# Patient Record
Sex: FEMALE | Race: WHITE | NOT HISPANIC OR LATINO | URBAN - METROPOLITAN AREA
[De-identification: names, ages, dates, MRNs, and addresses within clinical notes are randomized per-mention and may not be internally consistent; named-entity substitution may affect disease eponyms.]

---

## 2022-10-17 ENCOUNTER — OFFICE (OUTPATIENT)
Dept: URBAN - METROPOLITAN AREA CLINIC 29 | Facility: CLINIC | Age: 67
Setting detail: OPHTHALMOLOGY
End: 2022-10-17
Payer: MEDICARE

## 2022-10-17 ENCOUNTER — RX ONLY (RX ONLY)
Age: 67
End: 2022-10-17

## 2022-10-17 DIAGNOSIS — H43.811: ICD-10-CM

## 2022-10-17 DIAGNOSIS — H25.093: ICD-10-CM

## 2022-10-17 PROCEDURE — 92201 OPSCPY EXTND RTA DRAW UNI/BI: CPT | Performed by: OPHTHALMOLOGY

## 2022-10-17 PROCEDURE — 92134 CPTRZ OPH DX IMG PST SGM RTA: CPT | Performed by: OPHTHALMOLOGY

## 2022-10-17 PROCEDURE — 92004 COMPRE OPH EXAM NEW PT 1/>: CPT | Performed by: OPHTHALMOLOGY

## 2022-10-17 ASSESSMENT — REFRACTION_CURRENTRX
OS_CYLINDER: SPH
OD_AXIS: 151
OS_OVR_VA: 20/
OD_OVR_VA: 20/
OS_SPHERE: -3.25
OD_CYLINDER: +0.50
OD_SPHERE: -3.50
OD_ADD: +2.75

## 2022-10-17 ASSESSMENT — SPHEQUIV_DERIVED
OS_SPHEQUIV: -3.375
OD_SPHEQUIV: -3.875

## 2022-10-17 ASSESSMENT — VISUAL ACUITY
OD_BCVA: 20/25+
OS_BCVA: 20/20-

## 2022-10-17 ASSESSMENT — REFRACTION_AUTOREFRACTION
OD_CYLINDER: +1.75
OD_SPHERE: -4.75
OS_CYLINDER: +1.25
OS_SPHERE: -4.00
OD_AXIS: 100
OS_AXIS: 081

## 2022-10-17 ASSESSMENT — CONFRONTATIONAL VISUAL FIELD TEST (CVF)
OD_FINDINGS: FULL
OS_FINDINGS: FULL

## 2022-10-17 ASSESSMENT — TONOMETRY
OD_IOP_MMHG: 18
OS_IOP_MMHG: 19

## 2022-11-14 ENCOUNTER — OFFICE (OUTPATIENT)
Dept: URBAN - METROPOLITAN AREA CLINIC 29 | Facility: CLINIC | Age: 67
Setting detail: OPHTHALMOLOGY
End: 2022-11-14
Payer: MEDICARE

## 2022-11-14 DIAGNOSIS — H25.093: ICD-10-CM

## 2022-11-14 DIAGNOSIS — H43.811: ICD-10-CM

## 2022-11-14 PROCEDURE — 92250 FUNDUS PHOTOGRAPHY W/I&R: CPT | Performed by: OPHTHALMOLOGY

## 2022-11-14 PROCEDURE — 99213 OFFICE O/P EST LOW 20 MIN: CPT | Performed by: OPHTHALMOLOGY

## 2022-11-14 ASSESSMENT — VISUAL ACUITY
OS_BCVA: 20/20-
OD_BCVA: 20/25+

## 2022-11-14 ASSESSMENT — REFRACTION_CURRENTRX
OD_AXIS: 151
OD_SPHERE: -3.50
OD_CYLINDER: +0.50
OS_SPHERE: -3.25
OD_ADD: +2.75
OD_OVR_VA: 20/
OS_OVR_VA: 20/
OS_CYLINDER: SPH

## 2022-11-14 ASSESSMENT — REFRACTION_AUTOREFRACTION
OS_CYLINDER: +1.25
OD_AXIS: 100
OD_SPHERE: -4.75
OS_SPHERE: -4.00
OD_CYLINDER: +1.75
OS_AXIS: 081

## 2022-11-14 ASSESSMENT — CONFRONTATIONAL VISUAL FIELD TEST (CVF)
OS_FINDINGS: FULL
OD_FINDINGS: FULL

## 2022-11-14 ASSESSMENT — TONOMETRY
OS_IOP_MMHG: 17
OD_IOP_MMHG: 18

## 2022-11-14 ASSESSMENT — SPHEQUIV_DERIVED
OD_SPHEQUIV: -3.875
OS_SPHEQUIV: -3.375

## 2023-02-17 ENCOUNTER — OFFICE (OUTPATIENT)
Dept: URBAN - METROPOLITAN AREA CLINIC 29 | Facility: CLINIC | Age: 68
Setting detail: OPHTHALMOLOGY
End: 2023-02-17
Payer: MEDICARE

## 2023-02-17 DIAGNOSIS — H25.093: ICD-10-CM

## 2023-02-17 DIAGNOSIS — H43.811: ICD-10-CM

## 2023-02-17 PROCEDURE — 99213 OFFICE O/P EST LOW 20 MIN: CPT | Performed by: OPHTHALMOLOGY

## 2023-02-17 ASSESSMENT — REFRACTION_CURRENTRX
OD_ADD: +2.75
OD_OVR_VA: 20/
OD_CYLINDER: +0.50
OD_AXIS: 151
OS_OVR_VA: 20/
OS_SPHERE: -3.25
OS_CYLINDER: SPH
OD_SPHERE: -3.50

## 2023-02-17 ASSESSMENT — TONOMETRY
OS_IOP_MMHG: 17
OD_IOP_MMHG: 17

## 2023-02-17 ASSESSMENT — VISUAL ACUITY
OD_BCVA: 20/25+
OS_BCVA: 20/20

## 2023-02-17 ASSESSMENT — CONFRONTATIONAL VISUAL FIELD TEST (CVF)
OS_FINDINGS: FULL
OD_FINDINGS: FULL

## 2023-02-17 ASSESSMENT — REFRACTION_AUTOREFRACTION
OS_SPHERE: -4.00
OS_AXIS: 081
OD_CYLINDER: +1.75
OD_SPHERE: -4.75
OD_AXIS: 100
OS_CYLINDER: +1.25

## 2023-02-17 ASSESSMENT — SPHEQUIV_DERIVED
OS_SPHEQUIV: -3.375
OD_SPHEQUIV: -3.875

## 2023-11-13 ENCOUNTER — OFFICE (OUTPATIENT)
Dept: URBAN - METROPOLITAN AREA CLINIC 29 | Facility: CLINIC | Age: 68
Setting detail: OPHTHALMOLOGY
End: 2023-11-13
Payer: MEDICARE

## 2023-11-13 DIAGNOSIS — H43.811: ICD-10-CM

## 2023-11-13 DIAGNOSIS — H25.093: ICD-10-CM

## 2023-11-13 DIAGNOSIS — H52.13: ICD-10-CM

## 2023-11-13 DIAGNOSIS — H43.391: ICD-10-CM

## 2023-11-13 DIAGNOSIS — H25.13: ICD-10-CM

## 2023-11-13 PROBLEM — H52.7 REFRACTIVE ERROR: Status: ACTIVE | Noted: 2023-11-13

## 2023-11-13 PROCEDURE — 92014 COMPRE OPH EXAM EST PT 1/>: CPT | Performed by: OPHTHALMOLOGY

## 2023-11-13 PROCEDURE — 92015 DETERMINE REFRACTIVE STATE: CPT | Performed by: OPHTHALMOLOGY

## 2023-11-13 ASSESSMENT — REFRACTION_CURRENTRX
OD_AXIS: 151
OS_SPHERE: -3.50
OS_AXIS: 090
OD_SPHERE: -3.50
OS_CYLINDER: PLANO
OD_ADD: +2.75
OS_OVR_VA: 20/
OS_CYLINDER: SPH
OD_OVR_VA: 20/
OD_SPHERE: -3.50
OD_OVR_VA: 20/
OD_AXIS: 140
OS_OVR_VA: 20/
OS_SPHERE: -3.25
OD_CYLINDER: +0.50
OD_CYLINDER: +0.50

## 2023-11-13 ASSESSMENT — SPHEQUIV_DERIVED
OD_SPHEQUIV: -4
OS_SPHEQUIV: -3
OS_SPHEQUIV: -3.125
OD_SPHEQUIV: -3.125

## 2023-11-13 ASSESSMENT — CONFRONTATIONAL VISUAL FIELD TEST (CVF)
OS_FINDINGS: FULL
OD_FINDINGS: FULL

## 2023-11-13 ASSESSMENT — REFRACTION_AUTOREFRACTION
OD_SPHERE: -4.50
OD_CYLINDER: +1.00
OS_CYLINDER: +0.75
OS_AXIS: 080
OS_SPHERE: -3.50
OD_AXIS: 100

## 2023-11-13 ASSESSMENT — REFRACTION_MANIFEST
OS_SPHERE: -3.25
OS_VA1: 20/25
OS_CYLINDER: +0.50
OD_AXIS: 100
OD_CYLINDER: +0.75
OD_VA1: 20/20
OS_AXIS: 090
OD_SPHERE: -3.50

## 2024-01-29 ENCOUNTER — APPOINTMENT (OUTPATIENT)
Dept: PAIN MANAGEMENT | Facility: CLINIC | Age: 69
End: 2024-01-29
Payer: MEDICARE

## 2024-01-29 VITALS
BODY MASS INDEX: 28.53 KG/M2 | WEIGHT: 161 LBS | HEIGHT: 63 IN | SYSTOLIC BLOOD PRESSURE: 120 MMHG | DIASTOLIC BLOOD PRESSURE: 74 MMHG

## 2024-01-29 DIAGNOSIS — M54.16 RADICULOPATHY, LUMBAR REGION: ICD-10-CM

## 2024-01-29 DIAGNOSIS — Z86.39 PERSONAL HISTORY OF OTHER ENDOCRINE, NUTRITIONAL AND METABOLIC DISEASE: ICD-10-CM

## 2024-01-29 DIAGNOSIS — M47.817 SPONDYLOSIS W/OUT MYELOPATHY OR RADICULOPATHY, LUMBOSACRAL REGION: ICD-10-CM

## 2024-01-29 DIAGNOSIS — R52 PAIN, UNSPECIFIED: ICD-10-CM

## 2024-01-29 DIAGNOSIS — Z86.79 PERSONAL HISTORY OF OTHER DISEASES OF THE CIRCULATORY SYSTEM: ICD-10-CM

## 2024-01-29 DIAGNOSIS — M79.10 MYALGIA, UNSPECIFIED SITE: ICD-10-CM

## 2024-01-29 PROCEDURE — 99204 OFFICE O/P NEW MOD 45 MIN: CPT

## 2024-01-29 RX ORDER — ROSUVASTATIN CALCIUM 5 MG/1
5 TABLET, FILM COATED ORAL
Refills: 0 | Status: ACTIVE | COMMUNITY

## 2024-01-29 RX ORDER — LOSARTAN POTASSIUM 50 MG/1
50 TABLET, FILM COATED ORAL
Refills: 0 | Status: ACTIVE | COMMUNITY

## 2024-01-29 NOTE — HISTORY OF PRESENT ILLNESS
[Joint Pain] : joint  [_______] : [unfilled] [___ mths] : [unfilled] month(s) ago [Constant] : constant [3] : a current pain level of 3/10 [4] : a minimum pain level of 4/10 [6] : a maximum pain level of 6/10 [Burning] : burning [Laying] : laying [Sitting] : sitting [Ice] : ice [FreeTextEntry1] : HPI: Ms. CELI MURILLO is a 68 year F with pmhx of HTN, HLD, hip tendinosis, hx right gluteal tear. C/o bilateral thigh pain worsening over the past few months. Pain can be severe and impacts her ability to perform ADL's. Relief in the past with prn NSAIDS, walking, and MD directed exercises, which is no longer as effective. Reports improvement with ice and cannabis gummies and night. Denies any additional weakness, numbness, bowel/bladder dysfunction, history of bleeding disorders. Quality of life is impaired. There has been a severe exacerbation of the patient's chronic pain.   [FreeTextEntry7] : Joaquin leg pain

## 2024-01-29 NOTE — ASSESSMENT
[FreeTextEntry1] : 68 yof presents w/ low back and leg pain. Has had JOHN previously without any relief. Quality of life is impaired. There has been a severe exacerbation of the patient's chronic pain.  I have personally reviewed the patient's MRI in detail and discussed it with them which is significant for severe facet arthritis at L3, L4, and L5.  The patient has moderate to severe chronic axial back pain which has been present for at least three months and has failed to improve with conservative therapy. There is no untreated radiculopathy. There is no other known cause of axial back pain in this patient. The patient has failed to have relief with medication management and physical therapy. Given the patients failure to improve with all other conservative measures, recommend bilateral L3, L4, and L5 medial branch diagnostic block under fluoroscopic guidance. If the patient has significant relief of pain and improved quality of life following diagnostic block, will proceed to radiofrequency ablation.  I have discussed in detail with the patient that an interventional spine procedure is associated with potential risks. The procedure may include an injection of steroid and potentially other medications (local anesthetic and normal saline) into the epidural space or surrounding tissue of the spine. There are significant risks of this procedure which include and are not limited to infection, bleeding, worsening pain, dural puncture leading to post-dural puncture headache, nerve damage, spinal cord injury, paralysis, stroke, and death. There is a chance that the procedure does not improve their pain. There are risks associated with the steroid being absorbed into the body systemically. These include dysphoria, difficulty sleeping, mood swings, and personality changes. Pre-menopausal women may notice a regularity his in her menstrual cycle for 2-3 months following the injection. Steroids can specifically affect patients with hypertension, diabetes, and peptic ulcers. The procedure may cause a temporary increase in blood pressure and blood glucose, and may adversely affect a peptic ulcer. Other, more rare complications, including avascular necrosis of the joints, glaucoma, and osteoporosis. I have discussed the risks of the procedure at length with the patient, and the potential benefits of pain relief. I have offered alternatives to the procedure. All questions were answered. The patient expressed understanding and wishes to proceed with the procedure.  Physical therapy prescribed - goal will be to increase ROM, strengthening, postural training, other modalities ad mike which may include massage and stim. Goals of therapy discussed with the patient in detail and will be discussed with physical therapist. Patient will follow-up following course of physical therapy to monitor progress and adjust therapy as needed.  Acetaminophen 1,000 mg q8h prn for moderate pain. Risks, benefits, and alternatives of acetaminophen discussed with patient.  Ibuprofen 600 mg q8h prn add when pain is not adequately controlled with acetaminophen. Risks, benefits, and alternatives of ibuprofen discussed with patient.  Diet and nutritional strategies discussed which may improve patients pain and will improve overall health.

## 2024-01-29 NOTE — DATA REVIEWED
[FreeTextEntry1] : Exam: MRI LUMBAR SPINE   FINDINGS:  LOCALIZER: No additional findings. BONES: Mild right-sided endplate marrow edema at L4-L5. ALIGNMENT: Grade 1 anterolisthesis at L3-L4 SACROILIAC JOINTS/SACRUM: There is no sacral fracture. The SI joints are partially visualized but are intact. CONUS AND CAUDA EQUINA: The distal cord and conus are normal in signal. Conus terminates at L1. VISUALIZED INTRAPELVIC/INTRA-ABDOMINAL SOFT TISSUES: Small left-sided renal cysts. PARASPINAL SOFT TISSUES: Normal.   INDIVIDUAL LEVELS: As described previously there are only 4 lumbar vertebral bodies when counting from the cervical spine downward. Therefore the last fully sized lumbar disc space is L4-L5. This is similar in normal to the prior study.  LOWER THORACIC SPINE: No spinal canal or neuroforaminal stenosis.  T12-L1:Mild disc bulging with small endplate osteophyte formation. No spinal canal or foraminal narrowing. L1-L2: Mild facet arthrosis. Minimal disc bulging with small endplate osteophyte formation. L2-L3: Small to moderate posterior disc protrusion. Moderate facet arthrosis with ligamentous thickening. Prominent epidural fat. The combination of these findings results in mild-to-moderate spinal canal stenosis. L3-L4: Severe facet arthrosis with grade 1 anterolisthesis and mild disc bulging. No spinal canal or foraminal narrowing. L4-L5: Severe right and moderate left facet arthrosis. Mild to moderate disc bulging and endplate osteophyte formation. Mild right foraminal narrowing.  The vertebral level immediately inferior to L4 has the appearance of the sacrum    IMPRESSION:  The only 4 lumbar vertebral bodies as described previously.  Multilevel lumbar spondylosis as detailed above with mild spinal canal stenosis at L2-L3 that has mildly increased compared to the prior study. The remaining levels are relatively unchanged compared to the prior study.

## 2024-01-29 NOTE — CONSULT LETTER
[Dear  ___] : Dear  [unfilled], [Consult Letter:] : I had the pleasure of evaluating your patient, [unfilled]. [Please see my note below.] : Please see my note below. [Consult Closing:] : Thank you very much for allowing me to participate in the care of this patient.  If you have any questions, please do not hesitate to contact me. [Sincerely,] : Sincerely, [FreeTextEntry3] : Piero Gomez MD

## 2024-01-29 NOTE — REASON FOR VISIT
[Initial Consultation] : an initial pain management consultation [FreeTextEntry2] : Referred by Dr. Martinez

## 2024-02-07 ENCOUNTER — TRANSCRIPTION ENCOUNTER (OUTPATIENT)
Age: 69
End: 2024-02-07

## 2024-03-13 ENCOUNTER — TRANSCRIPTION ENCOUNTER (OUTPATIENT)
Age: 69
End: 2024-03-13

## 2025-08-15 ENCOUNTER — OFFICE (OUTPATIENT)
Dept: URBAN - METROPOLITAN AREA CLINIC 29 | Facility: CLINIC | Age: 70
Setting detail: OPHTHALMOLOGY
End: 2025-08-15
Payer: MEDICARE

## 2025-08-15 DIAGNOSIS — H52.7: ICD-10-CM

## 2025-08-15 DIAGNOSIS — H25.093: ICD-10-CM

## 2025-08-15 DIAGNOSIS — H16.223: ICD-10-CM

## 2025-08-15 DIAGNOSIS — H25.13: ICD-10-CM

## 2025-08-15 DIAGNOSIS — H43.391: ICD-10-CM

## 2025-08-15 DIAGNOSIS — H43.811: ICD-10-CM

## 2025-08-15 PROCEDURE — 92014 COMPRE OPH EXAM EST PT 1/>: CPT | Performed by: OPHTHALMOLOGY

## 2025-08-15 ASSESSMENT — REFRACTION_MANIFEST
OD_CYLINDER: +0.75
OS_SPHERE: -3.25
OD_SPHERE: -3.50
OS_AXIS: 090
OS_VA1: 20/25
OD_AXIS: 100
OD_VA1: 20/20
OS_CYLINDER: +0.50

## 2025-08-15 ASSESSMENT — CONFRONTATIONAL VISUAL FIELD TEST (CVF)
OS_FINDINGS: FULL
OD_FINDINGS: FULL

## 2025-08-15 ASSESSMENT — REFRACTION_CURRENTRX
OS_ADD: +2.75
OD_OVR_VA: 20/
OD_VPRISM_DIRECTION: PROGS
OS_CYLINDER: PLANO
OS_VPRISM_DIRECTION: PROGS
OD_SPHERE: -3.50
OD_AXIS: 145
OD_OVR_VA: 20/
OD_CYLINDER: +0.50
OS_OVR_VA: 20/
OS_CYLINDER: SPH
OS_AXIS: 090
OS_SPHERE: -3.25
OD_SPHERE: -3.75
OD_ADD: +2.75
OD_CYLINDER: +0.75
OS_OVR_VA: 20/
OD_AXIS: 140
OS_SPHERE: -3.50

## 2025-08-15 ASSESSMENT — REFRACTION_AUTOREFRACTION
OD_SPHERE: -3.00
OD_CYLINDER: +0.25
OD_AXIS: 133
OS_SPHERE: -2.50

## 2025-08-15 ASSESSMENT — SUPERFICIAL PUNCTATE KERATITIS (SPK)
OD_SPK: 1+
OS_SPK: 1+

## 2025-08-15 ASSESSMENT — VISUAL ACUITY
OS_BCVA: 20/20
OD_BCVA: 20/25-

## 2025-08-15 ASSESSMENT — TONOMETRY
OD_IOP_MMHG: 18
OS_IOP_MMHG: 17